# Patient Record
Sex: MALE | Race: WHITE | NOT HISPANIC OR LATINO | Employment: OTHER | URBAN - METROPOLITAN AREA
[De-identification: names, ages, dates, MRNs, and addresses within clinical notes are randomized per-mention and may not be internally consistent; named-entity substitution may affect disease eponyms.]

---

## 2022-09-29 ENCOUNTER — APPOINTMENT (OUTPATIENT)
Dept: CT IMAGING | Facility: HOSPITAL | Age: 32
End: 2022-09-29

## 2022-09-29 ENCOUNTER — HOSPITAL ENCOUNTER (EMERGENCY)
Facility: HOSPITAL | Age: 32
Discharge: HOME | End: 2022-09-29
Attending: EMERGENCY MEDICINE | Admitting: EMERGENCY MEDICINE

## 2022-09-29 VITALS
SYSTOLIC BLOOD PRESSURE: 141 MMHG | HEIGHT: 77 IN | WEIGHT: 315 LBS | RESPIRATION RATE: 27 BRPM | OXYGEN SATURATION: 97 % | BODY MASS INDEX: 37.19 KG/M2 | HEART RATE: 103 BPM | DIASTOLIC BLOOD PRESSURE: 66 MMHG | TEMPERATURE: 99.2 F

## 2022-09-29 DIAGNOSIS — R56.9 SEIZURE: Primary | ICD-10-CM

## 2022-09-29 LAB
ALBUMIN SERPL-MCNC: 4.8 G/DL (ref 3.5–5.2)
ALBUMIN/GLOB SERPL: 1.9 G/DL
ALP SERPL-CCNC: 61 U/L (ref 39–117)
ALT SERPL W P-5'-P-CCNC: 51 U/L (ref 1–41)
ANION GAP SERPL CALCULATED.3IONS-SCNC: 20.7 MMOL/L (ref 5–15)
AST SERPL-CCNC: 42 U/L (ref 1–40)
BASOPHILS # BLD AUTO: 0.03 10*3/MM3 (ref 0–0.2)
BASOPHILS NFR BLD AUTO: 0.4 % (ref 0–1.5)
BILIRUB SERPL-MCNC: 0.6 MG/DL (ref 0–1.2)
BUN SERPL-MCNC: 15 MG/DL (ref 6–20)
BUN/CREAT SERPL: 16.3 (ref 7–25)
CALCIUM SPEC-SCNC: 9.4 MG/DL (ref 8.6–10.5)
CHLORIDE SERPL-SCNC: 94 MMOL/L (ref 98–107)
CO2 SERPL-SCNC: 21.3 MMOL/L (ref 22–29)
CREAT SERPL-MCNC: 0.92 MG/DL (ref 0.76–1.27)
DEPRECATED RDW RBC AUTO: 46.6 FL (ref 37–54)
EGFRCR SERPLBLD CKD-EPI 2021: 111.9 ML/MIN/1.73
EOSINOPHIL # BLD AUTO: 0.03 10*3/MM3 (ref 0–0.4)
EOSINOPHIL NFR BLD AUTO: 0.4 % (ref 0.3–6.2)
ERYTHROCYTE [DISTWIDTH] IN BLOOD BY AUTOMATED COUNT: 16.3 % (ref 12.3–15.4)
ETHANOL BLD-MCNC: <10 MG/DL (ref 0–10)
ETHANOL UR QL: <0.01 %
GLOBULIN UR ELPH-MCNC: 2.5 GM/DL
GLUCOSE BLDC GLUCOMTR-MCNC: 276 MG/DL (ref 70–99)
GLUCOSE SERPL-MCNC: 267 MG/DL (ref 65–99)
HCT VFR BLD AUTO: 38.6 % (ref 37.5–51)
HGB BLD-MCNC: 13.7 G/DL (ref 13–17.7)
HOLD SPECIMEN: NORMAL
HOLD SPECIMEN: NORMAL
IMM GRANULOCYTES # BLD AUTO: 0.02 10*3/MM3 (ref 0–0.05)
IMM GRANULOCYTES NFR BLD AUTO: 0.3 % (ref 0–0.5)
LYMPHOCYTES # BLD AUTO: 1.25 10*3/MM3 (ref 0.7–3.1)
LYMPHOCYTES NFR BLD AUTO: 18.4 % (ref 19.6–45.3)
MCH RBC QN AUTO: 28.4 PG (ref 26.6–33)
MCHC RBC AUTO-ENTMCNC: 35.5 G/DL (ref 31.5–35.7)
MCV RBC AUTO: 80.1 FL (ref 79–97)
MONOCYTES # BLD AUTO: 0.82 10*3/MM3 (ref 0.1–0.9)
MONOCYTES NFR BLD AUTO: 12.1 % (ref 5–12)
NEUTROPHILS NFR BLD AUTO: 4.65 10*3/MM3 (ref 1.7–7)
NEUTROPHILS NFR BLD AUTO: 68.4 % (ref 42.7–76)
NRBC BLD AUTO-RTO: 0 /100 WBC (ref 0–0.2)
PLATELET # BLD AUTO: 117 10*3/MM3 (ref 140–450)
PMV BLD AUTO: 10.6 FL (ref 6–12)
POTASSIUM SERPL-SCNC: 3.3 MMOL/L (ref 3.5–5.2)
PROT SERPL-MCNC: 7.3 G/DL (ref 6–8.5)
QT INTERVAL: 308 MS
RBC # BLD AUTO: 4.82 10*6/MM3 (ref 4.14–5.8)
SODIUM SERPL-SCNC: 136 MMOL/L (ref 136–145)
WBC NRBC COR # BLD: 6.8 10*3/MM3 (ref 3.4–10.8)
WHOLE BLOOD HOLD COAG: NORMAL
WHOLE BLOOD HOLD SPECIMEN: NORMAL

## 2022-09-29 PROCEDURE — 70450 CT HEAD/BRAIN W/O DYE: CPT

## 2022-09-29 PROCEDURE — 80053 COMPREHEN METABOLIC PANEL: CPT | Performed by: EMERGENCY MEDICINE

## 2022-09-29 PROCEDURE — 93010 ELECTROCARDIOGRAM REPORT: CPT | Performed by: INTERNAL MEDICINE

## 2022-09-29 PROCEDURE — 82077 ASSAY SPEC XCP UR&BREATH IA: CPT | Performed by: EMERGENCY MEDICINE

## 2022-09-29 PROCEDURE — 93005 ELECTROCARDIOGRAM TRACING: CPT | Performed by: EMERGENCY MEDICINE

## 2022-09-29 PROCEDURE — 0 LEVETIRACETAM IN NACL 0.75% 1000 MG/100ML SOLUTION: Performed by: EMERGENCY MEDICINE

## 2022-09-29 PROCEDURE — 25010000002 LORAZEPAM PER 2 MG

## 2022-09-29 PROCEDURE — 25010000002 HALOPERIDOL LACTATE PER 5 MG: Performed by: EMERGENCY MEDICINE

## 2022-09-29 PROCEDURE — 99284 EMERGENCY DEPT VISIT MOD MDM: CPT

## 2022-09-29 PROCEDURE — 96375 TX/PRO/DX INJ NEW DRUG ADDON: CPT

## 2022-09-29 PROCEDURE — 82962 GLUCOSE BLOOD TEST: CPT

## 2022-09-29 PROCEDURE — 85025 COMPLETE CBC W/AUTO DIFF WBC: CPT | Performed by: EMERGENCY MEDICINE

## 2022-09-29 PROCEDURE — 96374 THER/PROPH/DIAG INJ IV PUSH: CPT

## 2022-09-29 PROCEDURE — 93005 ELECTROCARDIOGRAM TRACING: CPT

## 2022-09-29 RX ORDER — LEVETIRACETAM 500 MG/1
TABLET ORAL
Qty: 92 TABLET | Refills: 0 | Status: SHIPPED | OUTPATIENT
Start: 2022-09-29 | End: 2023-02-16

## 2022-09-29 RX ORDER — SODIUM CHLORIDE 0.9 % (FLUSH) 0.9 %
10 SYRINGE (ML) INJECTION AS NEEDED
Status: DISCONTINUED | OUTPATIENT
Start: 2022-09-29 | End: 2022-09-29 | Stop reason: HOSPADM

## 2022-09-29 RX ORDER — ETOMIDATE 2 MG/ML
20 INJECTION INTRAVENOUS ONCE
Status: COMPLETED | OUTPATIENT
Start: 2022-09-29 | End: 2022-09-29

## 2022-09-29 RX ORDER — LEVETIRACETAM 10 MG/ML
1000 INJECTION INTRAVASCULAR ONCE
Status: COMPLETED | OUTPATIENT
Start: 2022-09-29 | End: 2022-09-29

## 2022-09-29 RX ORDER — HALOPERIDOL 5 MG/ML
5 INJECTION INTRAMUSCULAR ONCE
Status: COMPLETED | OUTPATIENT
Start: 2022-09-29 | End: 2022-09-29

## 2022-09-29 RX ORDER — LORAZEPAM 2 MG/ML
INJECTION INTRAMUSCULAR
Status: COMPLETED
Start: 2022-09-29 | End: 2022-09-29

## 2022-09-29 RX ORDER — ETOMIDATE 2 MG/ML
INJECTION INTRAVENOUS
Status: COMPLETED
Start: 2022-09-29 | End: 2022-09-29

## 2022-09-29 RX ADMIN — SODIUM CHLORIDE 1000 ML: 9 INJECTION, SOLUTION INTRAVENOUS at 16:17

## 2022-09-29 RX ADMIN — HALOPERIDOL LACTATE 5 MG: 5 INJECTION, SOLUTION INTRAMUSCULAR at 16:10

## 2022-09-29 RX ADMIN — ETOMIDATE 20 MG: 2 INJECTION INTRAVENOUS at 16:21

## 2022-09-29 RX ADMIN — LEVETIRACETAM 1000 MG: 10 INJECTION, SOLUTION INTRAVENOUS at 16:13

## 2022-09-29 RX ADMIN — LORAZEPAM 2 MG: 2 INJECTION INTRAMUSCULAR; INTRAVENOUS at 15:50

## 2022-09-29 RX ADMIN — ETOMIDATE 20 MG: 40 INJECTION, SOLUTION INTRAVENOUS at 16:21

## 2023-02-16 ENCOUNTER — OFFICE VISIT (OUTPATIENT)
Dept: NEUROLOGY | Facility: CLINIC | Age: 33
End: 2023-02-16
Payer: COMMERCIAL

## 2023-02-16 VITALS
DIASTOLIC BLOOD PRESSURE: 78 MMHG | SYSTOLIC BLOOD PRESSURE: 167 MMHG | HEART RATE: 99 BPM | BODY MASS INDEX: 37.19 KG/M2 | HEIGHT: 77 IN | WEIGHT: 315 LBS

## 2023-02-16 DIAGNOSIS — R56.9 SEIZURE: Primary | ICD-10-CM

## 2023-02-16 PROCEDURE — 99204 OFFICE O/P NEW MOD 45 MIN: CPT | Performed by: NURSE PRACTITIONER

## 2023-02-16 RX ORDER — LISINOPRIL AND HYDROCHLOROTHIAZIDE 25; 20 MG/1; MG/1
1 TABLET ORAL EVERY MORNING
COMMUNITY
Start: 2023-02-02

## 2023-02-16 RX ORDER — LEVETIRACETAM 500 MG/1
500 TABLET ORAL 2 TIMES DAILY
COMMUNITY

## 2023-02-16 RX ORDER — METFORMIN HYDROCHLORIDE 500 MG/1
500 TABLET, EXTENDED RELEASE ORAL DAILY
COMMUNITY
Start: 2023-02-02

## 2023-02-16 NOTE — PROGRESS NOTES
"Chief Complaint  Neurologic Problem    Subjective          Arnaldo Lewis presents to [facility] NEUROLOGY & NEUROSURGERY  History of Present Illness  States he had a seizure in Sept 2022.  States he had quit drinking alcohol 5 days prior.  Had been drinking heavily, daily, prior to that since about age 19.  States at the time he didn't feel like alcohol was a real problem for him but looking back he believes it may have been.  Has not drank alcohol at all since Sept. Denies recurrent seizure like activity.  The ER had started Keppra in Sept, and he's continued to take it 500mg BID.       Objective   Vital Signs:   /78   Pulse 99   Ht 195.6 cm (77\")   Wt (!) 159 kg (351 lb 4.8 oz)   BMI 41.66 kg/m²     Physical Exam  HENT:      Head: Normocephalic.   Pulmonary:      Effort: Pulmonary effort is normal.   Neurological:      Mental Status: He is alert and oriented to person, place, and time.      Sensory: Sensation is intact.      Motor: Motor function is intact.      Coordination: Coordination is intact.      Deep Tendon Reflexes: Reflexes are normal and symmetric.        Neurologic Exam     Mental Status   Oriented to person, place, and time.        Result Review :               Assessment and Plan    Diagnoses and all orders for this visit:    1. Seizure (HCC) (Primary)  Assessment & Plan:  Likely alcohol withdrawal induced seizure.  Will order EEG for further evaluation. Continue Keppra for now.  Discussed routine seizure precautions including, but not limited to, avoiding bathing alone, swimming alone, climbing on ladders.  Also discussed need for medication compliance and risks of unmanaged epilepsy including status epilepticus, permanent brain injury or potentially death.  Patient is aware of [place] state law regarding no driving for 90 days following a seizure.      Orders:  -     EEG (Hospital Performed); Future      Follow Up   Return in about 4 months (around 6/16/2023) for seizure " f/u.  Patient was given instructions and counseling regarding his condition or for health maintenance advice. Please see specific information pulled into the AVS if appropriate.

## 2023-02-17 PROBLEM — R56.9 SEIZURE: Status: ACTIVE | Noted: 2023-02-17

## 2023-02-17 NOTE — ASSESSMENT & PLAN NOTE
Likely alcohol withdrawal induced seizure.  Will order EEG for further evaluation. Continue Keppra for now.  Discussed routine seizure precautions including, but not limited to, avoiding bathing alone, swimming alone, climbing on ladders.  Also discussed need for medication compliance and risks of unmanaged epilepsy including status epilepticus, permanent brain injury or potentially death.  Patient is aware of [place] state law regarding no driving for 90 days following a seizure.

## 2024-06-05 ENCOUNTER — OFFICE VISIT (OUTPATIENT)
Dept: NEUROLOGY | Facility: CLINIC | Age: 34
End: 2024-06-05
Payer: COMMERCIAL

## 2024-06-05 VITALS
HEART RATE: 77 BPM | HEIGHT: 77 IN | DIASTOLIC BLOOD PRESSURE: 78 MMHG | SYSTOLIC BLOOD PRESSURE: 157 MMHG | BODY MASS INDEX: 37.19 KG/M2 | WEIGHT: 315 LBS

## 2024-06-05 DIAGNOSIS — R56.9 SEIZURE: Primary | ICD-10-CM

## 2024-06-05 PROCEDURE — 99214 OFFICE O/P EST MOD 30 MIN: CPT | Performed by: NURSE PRACTITIONER

## 2024-06-05 PROCEDURE — 1159F MED LIST DOCD IN RCRD: CPT | Performed by: NURSE PRACTITIONER

## 2024-06-05 PROCEDURE — 1160F RVW MEDS BY RX/DR IN RCRD: CPT | Performed by: NURSE PRACTITIONER

## 2024-06-05 RX ORDER — CARVEDILOL 12.5 MG/1
1 TABLET ORAL EVERY 12 HOURS SCHEDULED
COMMUNITY
Start: 2024-04-15

## 2024-06-05 RX ORDER — LISINOPRIL 40 MG/1
40 TABLET ORAL EVERY MORNING
COMMUNITY
Start: 2024-04-15

## 2024-06-05 RX ORDER — HYDROCHLOROTHIAZIDE 25 MG/1
1 TABLET ORAL DAILY
COMMUNITY
Start: 2024-04-15

## 2024-06-05 NOTE — PROGRESS NOTES
"Chief Complaint  Seizures    Subjective          Arnaldo Lewis presents to [facility] NEUROLOGY & NEUROSURGERY  History of Present Illness  Following up for seizure.  One time event.  Complicated by alcohol use.  Did not get the EEG previously ordered.  Is no longer on Keppra.  No recurrent events. Wishing to drive but states there is a hold up with his DMV paperwork.     Interval History:   States he had a seizure in Sept 2022.  States he had quit drinking alcohol 5 days prior.  Had been drinking heavily, daily, prior to that since about age 19.  States at the time he didn't feel like alcohol was a real problem for him but looking back he believes it may have been.  Has not drank alcohol at all since Sept. Denies recurrent seizure like activity.  The ER had started Keppra in Sept, and he's continued to take it 500mg BID.       Objective   Vital Signs:   /78   Pulse 77   Ht 195.6 cm (77\")   Wt (!) 163 kg (359 lb 11.2 oz)   BMI 42.65 kg/m²     Physical Exam  HENT:      Head: Normocephalic.   Pulmonary:      Effort: Pulmonary effort is normal.   Neurological:      Mental Status: He is alert and oriented to person, place, and time.      Sensory: Sensation is intact.      Motor: Motor function is intact.      Coordination: Coordination is intact.      Deep Tendon Reflexes: Reflexes are normal and symmetric.        Neurologic Exam     Mental Status   Oriented to person, place, and time.        Result Review :               Assessment and Plan    Diagnoses and all orders for this visit:    1. Seizure (Primary)  Assessment & Plan:  Called and spoke to DMV.  They state they are requiring him to have EEG prior to unsuspending his license.  Will order EEG.      In general, we recommend using good judgement when you are doing certain activities and to avoid those activities that if you were to have a seizure, you could harm yourself or others. In the state [facility], it is the law that you cannot " drive within 90 days of a seizure. We also recommend not standing over open flames, not getting on high ladders or the roof, not swimming or taking baths by yourself (showers are ok) and not operating heavy machinery or power tools.       Orders:  -     EEG (Hospital Performed); Future      I spent 30 minutes caring for Arnaldo on this date of service. This time includes time spent by me in the following activities:preparing for the visit, reviewing tests, obtaining and/or reviewing a separately obtained history, performing a medically appropriate examination and/or evaluation , counseling and educating the patient/family/caregiver, ordering medications, tests, or procedures, documenting information in the medical record, and independently interpreting results and communicating that information with the patient/family/caregiver  Follow Up   Return if symptoms worsen or fail to improve.  Patient was given instructions and counseling regarding his condition or for health maintenance advice. Please see specific information pulled into the AVS if appropriate.

## 2024-06-07 NOTE — ASSESSMENT & PLAN NOTE
Called and spoke to DMV.  They state they are requiring him to have EEG prior to unsuspending his license.  Will order EEG.      In general, we recommend using good judgement when you are doing certain activities and to avoid those activities that if you were to have a seizure, you could harm yourself or others. In the [facility], it is the law that you cannot drive within 90 days of a seizure. We also recommend not standing over open flames, not getting on high ladders or the roof, not swimming or taking baths by yourself (showers are ok) and not operating heavy machinery or power tools.

## 2024-06-12 ENCOUNTER — HOSPITAL ENCOUNTER (OUTPATIENT)
Dept: NEUROLOGY | Facility: HOSPITAL | Age: 34
Discharge: HOME | End: 2024-06-12
Admitting: NURSE PRACTITIONER
Payer: COMMERCIAL

## 2024-06-12 DIAGNOSIS — R56.9 SEIZURE: ICD-10-CM

## 2024-06-12 PROCEDURE — 95816 EEG AWAKE AND DROWSY: CPT

## 2024-06-13 ENCOUNTER — TELEPHONE (OUTPATIENT)
Dept: NEUROLOGY | Facility: CLINIC | Age: 34
End: 2024-06-13
Payer: COMMERCIAL

## 2024-06-13 NOTE — TELEPHONE ENCOUNTER
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      PLEASE FAX TO:    FAX #:  [phone]    THANK YOU

## 2024-06-27 ENCOUNTER — OFFICE VISIT (OUTPATIENT)
Dept: NEUROLOGY | Facility: CLINIC | Age: 34
End: 2024-06-27
Payer: COMMERCIAL

## 2024-06-27 VITALS
BODY MASS INDEX: 37.19 KG/M2 | SYSTOLIC BLOOD PRESSURE: 147 MMHG | HEIGHT: 77 IN | DIASTOLIC BLOOD PRESSURE: 64 MMHG | WEIGHT: 315 LBS | HEART RATE: 57 BPM

## 2024-06-27 DIAGNOSIS — R56.9 SEIZURE: Primary | ICD-10-CM

## 2024-06-27 NOTE — ASSESSMENT & PLAN NOTE
EEG negative.  No concern for seizure disorder currently.  Has been seizure free for 2 years with only one spell previously that was likely alcohol induced.  He is now sober.  [facility] paperwork and EEG results have been faxed to [facility].

## 2024-06-27 NOTE — PROGRESS NOTES
"Chief Complaint  No chief complaint on file.    Subjective          Arnaldo Lewis presents to [facility] NEUROLOGY & NEUROSURGERY  History of Present Illness  Following up to discuss EEG.  No seizure like activity in 2 years.    Interval History:   States he had a seizure in 2022.  States he had quit drinking alcohol 5 days prior.  Had been drinking heavily, daily, prior to that since about age 19.  States at the time he didn't feel like alcohol was a real problem for him but looking back he believes it may have been.  Has not drank alcohol at all since Sept. Denies recurrent seizure like activity.  The ER had started Keppra in Sept, and he's continued to take it 500mg BID.       Objective   Vital Signs:   /64   Pulse 57   Ht 195.6 cm (77\")   Wt (!) 162 kg (357 lb 11.2 oz)   BMI 42.42 kg/m²     Physical Exam  HENT:      Head: Normocephalic.   Pulmonary:      Effort: Pulmonary effort is normal.   Neurological:      Mental Status: He is alert and oriented to person, place, and time.      Sensory: Sensation is intact.      Motor: Motor function is intact.      Coordination: Coordination is intact.      Deep Tendon Reflexes: Reflexes are normal and symmetric.        Neurologic Exam     Mental Status   Oriented to person, place, and time.        Result Review :            Results for orders placed or performed during the hospital encounter of 24   EEG (Hospital Performed)    Narrative    Table formatting from the original result was not included.  Images from the original result were not included.    [address] [place] [address]  [phone]        ELECTROENCEPHALOGRAPHIC REPORT    Patient: Arnaldo Lewis EEG # :    VFT-D-   : 1990  ID:      [id number]      Age: 34 y.o. male          Primary  Physician: Khris Burrell MD Technician: Rimma Chaudhry   Ordering  Physician: Juliet Suh APRN        Recording Date: 2024 Report  Date: " 6/12/2024             History:    Seizures    Medications:  Coreg, hydrochlorothiazide, lisinopril, metformin, vitamin D.    Reading:   The EEG was obtained during the waking as well as on hyperventilation and   on photic stimulation with video monitoring.  He had 5 hours of sleep the   night before the testing.    The dominant waking background activity consists of symmetric and   irregular 20 to 40 µV 10 to 11 cps which were prominent on both   parieto-occipital temporal regions and were reactive to changes in states.    There were symmetric responses on photic stimulation at various   frequencies ranging from 9 to 24 Hz.  There was no amplitude asymmetry.    No paroxysmal discharges.      Impression:   Normal EEG during the waking and light stages of sleep  Hyperventilation and photic stimulation did not activate any   abnormalities.  There were no epileptiform discharges noted today.          Electronically signed by Rubin Guidry Jr., MD, 06/12/24, 10:54 PM   EDT.      Please note that portions of this note were completed with a voice   recognition program.  Part of this note is an electric or electronic   transcription/translation of spoken language to printed text using the   dragon dictating system.     Results for orders placed or performed during the hospital encounter of 09/29/22   CT Head Without Contrast    Narrative    PROCEDURE: CT HEAD WO CONTRAST     COMPARISON:  None  INDICATIONS: seizure     PROTOCOL:   Standard imaging protocol performed      RADIATION:   DLP: 1780.3mGy*cm    MA and/or KV was adjusted to minimize radiation dose.          TECHNIQUE: After obtaining the patient's consent, CT images were obtained without non-ionic   intravenous contrast material.      FINDINGS:   There is no evidence of acute infarct or hemorrhage.  No abnormal extra-axial fluid collections are   seen.  No mass effect or hydrocephalus.  There is mucosal thickening within the bilateral maxillary   sinuses  compatible changes of chronic sinusitis.  Mastoid air cells are clear bilaterally.  Globes   and orbits are within normal limits.       Impression       1. No acute intracranial abnormality.            BRIANNA BOYLE MD         Electronically Signed and Approved By: BRIANNA BOYLE MD on 9/29/2022 at 16:54                         Assessment and Plan    Diagnoses and all orders for this visit:    1. Seizure (Primary)  Assessment & Plan:  EEG negative.  No concern for seizure disorder currently.  Has been seizure free for 2 years with only one spell previously that was likely alcohol induced.  He is now sober.  [facility] paperwork and EEG results have been faxed to [facility].             I spent 20 minutes caring for Arnaldo on this date of service. This time includes time spent by me in the following activities:preparing for the visit, reviewing tests, obtaining and/or reviewing a separately obtained history, performing a medically appropriate examination and/or evaluation , counseling and educating the patient/family/caregiver, documenting information in the medical record, and care coordination  Follow Up   Return if symptoms worsen or fail to improve.  Patient was given instructions and counseling regarding his condition or for health maintenance advice. Please see specific information pulled into the AVS if appropriate.